# Patient Record
Sex: FEMALE | Race: WHITE | URBAN - METROPOLITAN AREA
[De-identification: names, ages, dates, MRNs, and addresses within clinical notes are randomized per-mention and may not be internally consistent; named-entity substitution may affect disease eponyms.]

---

## 2017-04-21 ENCOUNTER — FOLLOW UP ESTABLISHED (OUTPATIENT)
Dept: URBAN - METROPOLITAN AREA CLINIC 24 | Facility: CLINIC | Age: 77
End: 2017-04-21
Payer: COMMERCIAL

## 2017-04-21 PROCEDURE — 92012 INTRM OPH EXAM EST PATIENT: CPT | Performed by: OPHTHALMOLOGY

## 2017-04-21 RX ORDER — TIMOLOL MALEATE 5 MG/ML
0.5 % SOLUTION/ DROPS OPHTHALMIC
Qty: 3 | Refills: 3 | Status: INACTIVE
Start: 2017-04-21 | End: 2019-02-26

## 2017-04-21 ASSESSMENT — INTRAOCULAR PRESSURE
OS: 21
OD: 22

## 2017-09-12 ENCOUNTER — FOLLOW UP ESTABLISHED (OUTPATIENT)
Dept: URBAN - METROPOLITAN AREA CLINIC 24 | Facility: CLINIC | Age: 77
End: 2017-09-12
Payer: COMMERCIAL

## 2017-09-12 DIAGNOSIS — H40.053 OCULAR HYPERTENSION, BILATERAL: Primary | ICD-10-CM

## 2017-09-12 PROCEDURE — 92250 FUNDUS PHOTOGRAPHY W/I&R: CPT | Performed by: OPHTHALMOLOGY

## 2017-09-12 PROCEDURE — 92014 COMPRE OPH EXAM EST PT 1/>: CPT | Performed by: OPHTHALMOLOGY

## 2017-09-12 PROCEDURE — 92083 EXTENDED VISUAL FIELD XM: CPT | Performed by: OPHTHALMOLOGY

## 2017-09-12 ASSESSMENT — INTRAOCULAR PRESSURE
OD: 19
OD: 20
OS: 19
OS: 15

## 2018-02-21 ENCOUNTER — FOLLOW UP ESTABLISHED (OUTPATIENT)
Dept: URBAN - METROPOLITAN AREA CLINIC 26 | Facility: CLINIC | Age: 78
End: 2018-02-21
Payer: MEDICARE

## 2018-02-21 DIAGNOSIS — H43.813 VITREOUS DEGENERATION, BILATERAL: ICD-10-CM

## 2018-02-21 PROCEDURE — 92014 COMPRE OPH EXAM EST PT 1/>: CPT | Performed by: OPTOMETRIST

## 2018-02-21 ASSESSMENT — INTRAOCULAR PRESSURE
OS: 21
OD: 22

## 2018-02-21 ASSESSMENT — VISUAL ACUITY
OD: 20/25
OS: 20/20

## 2018-07-23 ENCOUNTER — FOLLOW UP ESTABLISHED (OUTPATIENT)
Dept: URBAN - METROPOLITAN AREA CLINIC 26 | Facility: CLINIC | Age: 78
End: 2018-07-23
Payer: MEDICARE

## 2018-07-23 DIAGNOSIS — H04.123 TEAR FILM INSUFFICIENCY OF BILATERAL LACRIMAL GLANDS: Primary | ICD-10-CM

## 2018-07-23 PROCEDURE — 92014 COMPRE OPH EXAM EST PT 1/>: CPT | Performed by: OPTOMETRIST

## 2018-07-23 ASSESSMENT — INTRAOCULAR PRESSURE
OS: 16
OD: 24

## 2019-02-26 ENCOUNTER — FOLLOW UP ESTABLISHED (OUTPATIENT)
Dept: URBAN - METROPOLITAN AREA CLINIC 26 | Facility: CLINIC | Age: 79
End: 2019-02-26
Payer: MEDICARE

## 2019-02-26 DIAGNOSIS — H35.371 PUCKERING OF MACULA, RIGHT EYE: ICD-10-CM

## 2019-02-26 PROCEDURE — 92083 EXTENDED VISUAL FIELD XM: CPT | Performed by: OPTOMETRIST

## 2019-02-26 PROCEDURE — 92134 CPTRZ OPH DX IMG PST SGM RTA: CPT | Performed by: OPTOMETRIST

## 2019-02-26 PROCEDURE — 92014 COMPRE OPH EXAM EST PT 1/>: CPT | Performed by: OPTOMETRIST

## 2019-02-26 PROCEDURE — 92133 CPTRZD OPH DX IMG PST SGM ON: CPT | Performed by: OPTOMETRIST

## 2019-02-26 RX ORDER — TIMOLOL MALEATE 5 MG/ML
0.5 % SOLUTION/ DROPS OPHTHALMIC
Qty: 3 | Refills: 3 | Status: INACTIVE
Start: 2019-02-26 | End: 2020-09-14

## 2019-02-26 RX ORDER — BIMATOPROST 0.1 MG/ML
0.01 % SOLUTION/ DROPS OPHTHALMIC
Qty: 1 | Refills: 4 | Status: INACTIVE
Start: 2019-02-26 | End: 2019-02-27

## 2019-02-26 ASSESSMENT — KERATOMETRY
OD: 46.13
OS: 46.88

## 2019-02-26 ASSESSMENT — INTRAOCULAR PRESSURE
OS: 20
OD: 35

## 2019-02-26 ASSESSMENT — VISUAL ACUITY
OS: 20/20
OD: 20/30

## 2019-03-12 ENCOUNTER — FOLLOW UP ESTABLISHED (OUTPATIENT)
Dept: URBAN - METROPOLITAN AREA CLINIC 10 | Facility: CLINIC | Age: 79
End: 2019-03-12
Payer: MEDICARE

## 2019-03-12 DIAGNOSIS — H40.1434 CAPSULAR GLAUCOMA W/ PSEUDOEXFOLIATION OF LENS, BILATERAL, INDETERMINATE STAGE: ICD-10-CM

## 2019-03-12 PROCEDURE — 92014 COMPRE OPH EXAM EST PT 1/>: CPT | Performed by: OPHTHALMOLOGY

## 2019-03-12 PROCEDURE — 76514 ECHO EXAM OF EYE THICKNESS: CPT | Performed by: OPHTHALMOLOGY

## 2019-03-12 RX ORDER — NETARSUDIL 0.2 MG/ML
0.02 % SOLUTION/ DROPS OPHTHALMIC; TOPICAL
Qty: 3 | Refills: 1 | Status: INACTIVE
Start: 2019-03-12 | End: 2019-03-13

## 2019-03-12 ASSESSMENT — INTRAOCULAR PRESSURE
OD: 34
OS: 23

## 2019-03-13 ENCOUNTER — CONSULT (OUTPATIENT)
Dept: URBAN - METROPOLITAN AREA CLINIC 24 | Facility: CLINIC | Age: 79
End: 2019-03-13
Payer: MEDICARE

## 2019-03-13 PROCEDURE — 92134 CPTRZ OPH DX IMG PST SGM RTA: CPT | Performed by: OPHTHALMOLOGY

## 2019-03-13 PROCEDURE — 92014 COMPRE OPH EXAM EST PT 1/>: CPT | Performed by: OPHTHALMOLOGY

## 2019-03-13 ASSESSMENT — INTRAOCULAR PRESSURE
OS: 17
OD: 33

## 2019-03-20 ENCOUNTER — FOLLOW UP ESTABLISHED (OUTPATIENT)
Dept: URBAN - METROPOLITAN AREA CLINIC 26 | Facility: CLINIC | Age: 79
End: 2019-03-20
Payer: MEDICARE

## 2019-03-20 PROCEDURE — 92083 EXTENDED VISUAL FIELD XM: CPT | Performed by: OPTOMETRIST

## 2019-03-20 PROCEDURE — 92012 INTRM OPH EXAM EST PATIENT: CPT | Performed by: OPTOMETRIST

## 2019-03-20 ASSESSMENT — INTRAOCULAR PRESSURE
OD: 21
OS: 15

## 2019-06-19 ENCOUNTER — FOLLOW UP ESTABLISHED (OUTPATIENT)
Dept: URBAN - METROPOLITAN AREA CLINIC 24 | Facility: CLINIC | Age: 79
End: 2019-06-19
Payer: MEDICARE

## 2019-06-19 PROCEDURE — 92134 CPTRZ OPH DX IMG PST SGM RTA: CPT | Performed by: OPHTHALMOLOGY

## 2019-06-19 PROCEDURE — 92014 COMPRE OPH EXAM EST PT 1/>: CPT | Performed by: OPHTHALMOLOGY

## 2019-06-19 ASSESSMENT — INTRAOCULAR PRESSURE
OD: 20
OS: 17

## 2019-06-21 ENCOUNTER — FOLLOW UP ESTABLISHED (OUTPATIENT)
Dept: URBAN - METROPOLITAN AREA CLINIC 24 | Facility: CLINIC | Age: 79
End: 2019-06-21
Payer: MEDICARE

## 2019-06-21 PROCEDURE — 92012 INTRM OPH EXAM EST PATIENT: CPT | Performed by: OPHTHALMOLOGY

## 2019-06-21 ASSESSMENT — INTRAOCULAR PRESSURE
OD: 19
OS: 16

## 2019-09-04 ENCOUNTER — FOLLOW UP ESTABLISHED (OUTPATIENT)
Dept: URBAN - METROPOLITAN AREA CLINIC 24 | Facility: CLINIC | Age: 79
End: 2019-09-04
Payer: MEDICARE

## 2019-09-04 PROCEDURE — 92012 INTRM OPH EXAM EST PATIENT: CPT | Performed by: OPHTHALMOLOGY

## 2019-09-04 ASSESSMENT — INTRAOCULAR PRESSURE
OD: 26
OS: 18

## 2019-09-25 ENCOUNTER — FOLLOW UP ESTABLISHED (OUTPATIENT)
Dept: URBAN - METROPOLITAN AREA CLINIC 24 | Facility: CLINIC | Age: 79
End: 2019-09-25
Payer: MEDICARE

## 2019-09-25 PROCEDURE — 92020 GONIOSCOPY: CPT | Performed by: OPHTHALMOLOGY

## 2019-09-25 PROCEDURE — 92014 COMPRE OPH EXAM EST PT 1/>: CPT | Performed by: OPHTHALMOLOGY

## 2019-09-25 RX ORDER — NETARSUDIL 0.2 MG/ML
0.02 % SOLUTION/ DROPS OPHTHALMIC; TOPICAL
Qty: 3 | Refills: 1 | Status: INACTIVE
Start: 2019-09-25 | End: 2020-08-03

## 2019-09-25 ASSESSMENT — INTRAOCULAR PRESSURE
OD: 36
OS: 22

## 2019-10-22 ENCOUNTER — Encounter (OUTPATIENT)
Dept: URBAN - METROPOLITAN AREA CLINIC 24 | Facility: CLINIC | Age: 79
End: 2019-10-22
Payer: MEDICARE

## 2019-10-22 PROCEDURE — 65855 TRABECULOPLASTY LASER SURG: CPT | Performed by: OPHTHALMOLOGY

## 2019-10-23 ENCOUNTER — Encounter (OUTPATIENT)
Dept: URBAN - METROPOLITAN AREA CLINIC 24 | Facility: CLINIC | Age: 79
End: 2019-10-23
Payer: MEDICARE

## 2019-10-23 PROCEDURE — 65855 TRABECULOPLASTY LASER SURG: CPT | Performed by: OPHTHALMOLOGY

## 2019-11-05 ENCOUNTER — FOLLOW UP ESTABLISHED (OUTPATIENT)
Dept: URBAN - METROPOLITAN AREA CLINIC 24 | Facility: CLINIC | Age: 79
End: 2019-11-05
Payer: MEDICARE

## 2019-11-05 PROCEDURE — 92134 CPTRZ OPH DX IMG PST SGM RTA: CPT | Performed by: OPHTHALMOLOGY

## 2019-11-05 PROCEDURE — 92014 COMPRE OPH EXAM EST PT 1/>: CPT | Performed by: OPHTHALMOLOGY

## 2019-11-05 RX ORDER — PREDNISOLONE ACETATE 10 MG/ML
1 % SUSPENSION/ DROPS OPHTHALMIC
Qty: 1 | Refills: 1 | Status: INACTIVE
Start: 2019-11-05 | End: 2020-01-31

## 2019-11-05 ASSESSMENT — INTRAOCULAR PRESSURE
OD: 22
OS: 15

## 2019-12-03 ENCOUNTER — FOLLOW UP ESTABLISHED (OUTPATIENT)
Dept: URBAN - METROPOLITAN AREA CLINIC 24 | Facility: CLINIC | Age: 79
End: 2019-12-03
Payer: MEDICARE

## 2019-12-03 PROCEDURE — 92250 FUNDUS PHOTOGRAPHY W/I&R: CPT | Performed by: OPHTHALMOLOGY

## 2019-12-03 PROCEDURE — 92014 COMPRE OPH EXAM EST PT 1/>: CPT | Performed by: OPHTHALMOLOGY

## 2019-12-03 ASSESSMENT — INTRAOCULAR PRESSURE
OS: 19
OD: 39

## 2019-12-05 ENCOUNTER — FOLLOW UP ESTABLISHED (OUTPATIENT)
Dept: URBAN - METROPOLITAN AREA CLINIC 24 | Facility: CLINIC | Age: 79
End: 2019-12-05
Payer: MEDICARE

## 2019-12-05 PROCEDURE — 92012 INTRM OPH EXAM EST PATIENT: CPT | Performed by: OPHTHALMOLOGY

## 2019-12-05 ASSESSMENT — INTRAOCULAR PRESSURE
OS: 20
OD: 21

## 2019-12-12 ENCOUNTER — FOLLOW UP ESTABLISHED (OUTPATIENT)
Dept: URBAN - METROPOLITAN AREA CLINIC 24 | Facility: CLINIC | Age: 79
End: 2019-12-12
Payer: MEDICARE

## 2019-12-12 PROCEDURE — 92014 COMPRE OPH EXAM EST PT 1/>: CPT | Performed by: OPHTHALMOLOGY

## 2019-12-12 PROCEDURE — 92134 CPTRZ OPH DX IMG PST SGM RTA: CPT | Performed by: OPHTHALMOLOGY

## 2019-12-12 ASSESSMENT — INTRAOCULAR PRESSURE
OS: 18
OD: 28

## 2020-01-09 ENCOUNTER — FOLLOW UP ESTABLISHED (OUTPATIENT)
Dept: URBAN - METROPOLITAN AREA CLINIC 24 | Facility: CLINIC | Age: 80
End: 2020-01-09
Payer: MEDICARE

## 2020-01-09 PROCEDURE — 92012 INTRM OPH EXAM EST PATIENT: CPT | Performed by: OPHTHALMOLOGY

## 2020-01-09 ASSESSMENT — INTRAOCULAR PRESSURE
OS: 17
OD: 28

## 2020-01-31 ENCOUNTER — FOLLOW UP ESTABLISHED (OUTPATIENT)
Dept: URBAN - METROPOLITAN AREA CLINIC 24 | Facility: CLINIC | Age: 80
End: 2020-01-31
Payer: MEDICARE

## 2020-01-31 PROCEDURE — 92014 COMPRE OPH EXAM EST PT 1/>: CPT | Performed by: OPHTHALMOLOGY

## 2020-01-31 PROCEDURE — 92020 GONIOSCOPY: CPT | Performed by: OPHTHALMOLOGY

## 2020-01-31 ASSESSMENT — INTRAOCULAR PRESSURE
OD: 29
OS: 19

## 2020-03-13 ENCOUNTER — SURGERY (OUTPATIENT)
Dept: URBAN - METROPOLITAN AREA SURGERY 12 | Facility: SURGERY | Age: 80
End: 2020-03-13
Payer: MEDICARE

## 2020-03-13 PROCEDURE — 65855 TRABECULOPLASTY LASER SURG: CPT | Performed by: OPHTHALMOLOGY

## 2020-04-24 ENCOUNTER — FOLLOW UP ESTABLISHED (OUTPATIENT)
Dept: URBAN - METROPOLITAN AREA CLINIC 24 | Facility: CLINIC | Age: 80
End: 2020-04-24
Payer: MEDICARE

## 2020-04-24 PROCEDURE — 92133 CPTRZD OPH DX IMG PST SGM ON: CPT | Performed by: OPHTHALMOLOGY

## 2020-04-24 PROCEDURE — 92012 INTRM OPH EXAM EST PATIENT: CPT | Performed by: OPHTHALMOLOGY

## 2020-04-24 RX ORDER — BRIMONIDINE TARTRATE 2 MG/ML
0.2 % SOLUTION/ DROPS OPHTHALMIC
Qty: 3 | Refills: 3 | Status: INACTIVE
Start: 2020-04-24 | End: 2020-09-14

## 2020-04-24 ASSESSMENT — INTRAOCULAR PRESSURE
OS: 19
OD: 25

## 2020-07-23 ENCOUNTER — FOLLOW UP ESTABLISHED (OUTPATIENT)
Dept: URBAN - METROPOLITAN AREA CLINIC 24 | Facility: CLINIC | Age: 80
End: 2020-07-23
Payer: MEDICARE

## 2020-07-23 PROCEDURE — 92012 INTRM OPH EXAM EST PATIENT: CPT | Performed by: OPHTHALMOLOGY

## 2020-07-23 PROCEDURE — 92083 EXTENDED VISUAL FIELD XM: CPT | Performed by: OPHTHALMOLOGY

## 2020-07-23 ASSESSMENT — INTRAOCULAR PRESSURE
OD: 36
OS: 21

## 2020-08-03 ENCOUNTER — Encounter (OUTPATIENT)
Dept: URBAN - METROPOLITAN AREA CLINIC 24 | Facility: CLINIC | Age: 80
End: 2020-08-03
Payer: MEDICARE

## 2020-08-03 DIAGNOSIS — Z01.818 ENCOUNTER FOR OTHER PREPROCEDURAL EXAMINATION: Primary | ICD-10-CM

## 2020-08-03 PROCEDURE — 99213 OFFICE O/P EST LOW 20 MIN: CPT | Performed by: PHYSICIAN ASSISTANT

## 2020-08-03 RX ORDER — ATORVASTATIN CALCIUM 20 MG/1
20 MG TABLET, FILM COATED ORAL
Qty: 0 | Refills: 0 | Status: ACTIVE
Start: 2020-08-03

## 2020-08-03 RX ORDER — OMEPRAZOLE 20 MG/1
20 MG CAPSULE, DELAYED RELEASE ORAL
Qty: 0 | Refills: 0 | Status: ACTIVE
Start: 2020-08-03

## 2020-08-07 ENCOUNTER — SURGERY (OUTPATIENT)
Dept: URBAN - METROPOLITAN AREA SURGERY 12 | Facility: SURGERY | Age: 80
End: 2020-08-07
Payer: MEDICARE

## 2020-08-07 PROCEDURE — 66180 AQUEOUS SHUNT EYE W/GRAFT: CPT | Performed by: OPHTHALMOLOGY

## 2020-08-08 ENCOUNTER — POST OP (OUTPATIENT)
Dept: URBAN - METROPOLITAN AREA CLINIC 10 | Facility: CLINIC | Age: 80
End: 2020-08-08

## 2020-08-08 PROCEDURE — 99024 POSTOP FOLLOW-UP VISIT: CPT | Performed by: OPTOMETRIST

## 2020-08-08 ASSESSMENT — INTRAOCULAR PRESSURE: OD: 20

## 2020-08-14 ENCOUNTER — FOLLOW UP ESTABLISHED (OUTPATIENT)
Dept: URBAN - METROPOLITAN AREA CLINIC 24 | Facility: CLINIC | Age: 80
End: 2020-08-14
Payer: MEDICARE

## 2020-08-14 PROCEDURE — 99024 POSTOP FOLLOW-UP VISIT: CPT | Performed by: OPHTHALMOLOGY

## 2020-08-14 RX ORDER — OFLOXACIN 3 MG/ML
0.3 % SOLUTION/ DROPS OPHTHALMIC
Qty: 1 | Refills: 1 | Status: INACTIVE
Start: 2020-08-14 | End: 2020-09-11

## 2020-08-14 RX ORDER — PREDNISOLONE ACETATE 10 MG/ML
1 % SUSPENSION/ DROPS OPHTHALMIC
Qty: 1 | Refills: 1 | Status: INACTIVE
Start: 2020-08-14 | End: 2020-09-11

## 2020-08-14 ASSESSMENT — INTRAOCULAR PRESSURE
OS: 22
OD: 22

## 2020-09-11 ENCOUNTER — FOLLOW UP ESTABLISHED (OUTPATIENT)
Dept: URBAN - METROPOLITAN AREA CLINIC 24 | Facility: CLINIC | Age: 80
End: 2020-09-11
Payer: MEDICARE

## 2020-09-11 PROCEDURE — 99024 POSTOP FOLLOW-UP VISIT: CPT | Performed by: OPHTHALMOLOGY

## 2020-09-11 RX ORDER — PREDNISOLONE ACETATE 10 MG/ML
1 % SUSPENSION/ DROPS OPHTHALMIC
Qty: 1 | Refills: 1 | Status: INACTIVE
Start: 2020-09-11 | End: 2020-09-23

## 2020-09-11 ASSESSMENT — INTRAOCULAR PRESSURE
OS: 18
OD: 3

## 2020-09-14 ENCOUNTER — CONSULT (OUTPATIENT)
Dept: URBAN - METROPOLITAN AREA CLINIC 24 | Facility: CLINIC | Age: 80
End: 2020-09-14
Payer: MEDICARE

## 2020-09-14 DIAGNOSIS — H21.01 HYPHEMA, RIGHT EYE: ICD-10-CM

## 2020-09-14 DIAGNOSIS — H33.021 RETINAL DETACHMENT WITH MULTIPLE BREAKS, RIGHT EYE: Primary | ICD-10-CM

## 2020-09-14 DIAGNOSIS — H43.11 VITREOUS HEMORRHAGE, RIGHT EYE: Primary | ICD-10-CM

## 2020-09-14 DIAGNOSIS — H31.9 UNSPECIFIED DISORDER OF CHOROID: ICD-10-CM

## 2020-09-14 PROCEDURE — 92014 COMPRE OPH EXAM EST PT 1/>: CPT | Performed by: OPTOMETRIST

## 2020-09-14 PROCEDURE — 92134 CPTRZ OPH DX IMG PST SGM RTA: CPT | Performed by: OPTOMETRIST

## 2020-09-14 PROCEDURE — 92134 CPTRZ OPH DX IMG PST SGM RTA: CPT | Performed by: OPHTHALMOLOGY

## 2020-09-14 PROCEDURE — 92014 COMPRE OPH EXAM EST PT 1/>: CPT | Performed by: OPHTHALMOLOGY

## 2020-09-14 RX ORDER — BRIMONIDINE TARTRATE 2 MG/ML
0.2 % SOLUTION/ DROPS OPHTHALMIC
Qty: 3 | Refills: 3 | Status: INACTIVE
Start: 2020-09-14 | End: 2020-09-23

## 2020-09-14 RX ORDER — TIMOLOL MALEATE 5 MG/ML
0.5 % SOLUTION/ DROPS OPHTHALMIC
Qty: 3 | Refills: 3 | Status: INACTIVE
Start: 2020-09-14 | End: 2021-02-11

## 2020-09-14 ASSESSMENT — INTRAOCULAR PRESSURE
OS: 18
OS: 18
OD: 8
OD: 8

## 2020-09-23 ENCOUNTER — FOLLOW UP ESTABLISHED (OUTPATIENT)
Dept: URBAN - METROPOLITAN AREA CLINIC 24 | Facility: CLINIC | Age: 80
End: 2020-09-23
Payer: MEDICARE

## 2020-09-23 PROCEDURE — 99024 POSTOP FOLLOW-UP VISIT: CPT | Performed by: OPHTHALMOLOGY

## 2020-09-23 RX ORDER — BRIMONIDINE TARTRATE 2 MG/ML
0.2 % SOLUTION/ DROPS OPHTHALMIC
Qty: 3 | Refills: 3 | Status: INACTIVE
Start: 2020-09-23 | End: 2021-02-11

## 2020-09-23 RX ORDER — PREDNISOLONE ACETATE 10 MG/ML
1 % SUSPENSION/ DROPS OPHTHALMIC
Qty: 1 | Refills: 1 | Status: INACTIVE
Start: 2020-09-23 | End: 2021-02-11

## 2020-09-23 ASSESSMENT — INTRAOCULAR PRESSURE
OS: 21
OD: 12

## 2020-10-09 ENCOUNTER — FOLLOW UP ESTABLISHED (OUTPATIENT)
Dept: URBAN - METROPOLITAN AREA CLINIC 24 | Facility: CLINIC | Age: 80
End: 2020-10-09
Payer: MEDICARE

## 2020-10-09 PROCEDURE — 99024 POSTOP FOLLOW-UP VISIT: CPT | Performed by: OPHTHALMOLOGY

## 2020-10-09 ASSESSMENT — INTRAOCULAR PRESSURE
OD: 13
OS: 15

## 2020-11-06 ENCOUNTER — FOLLOW UP ESTABLISHED (OUTPATIENT)
Dept: URBAN - METROPOLITAN AREA CLINIC 24 | Facility: CLINIC | Age: 80
End: 2020-11-06
Payer: MEDICARE

## 2020-11-06 PROCEDURE — 99024 POSTOP FOLLOW-UP VISIT: CPT | Performed by: OPHTHALMOLOGY

## 2020-11-06 ASSESSMENT — INTRAOCULAR PRESSURE
OS: 17
OD: 13

## 2020-12-03 ENCOUNTER — FOLLOW UP ESTABLISHED (OUTPATIENT)
Dept: URBAN - METROPOLITAN AREA CLINIC 24 | Facility: CLINIC | Age: 80
End: 2020-12-03
Payer: MEDICARE

## 2020-12-03 PROCEDURE — 92012 INTRM OPH EXAM EST PATIENT: CPT | Performed by: OPHTHALMOLOGY

## 2020-12-03 ASSESSMENT — INTRAOCULAR PRESSURE
OS: 19
OD: 13

## 2021-02-11 ENCOUNTER — FOLLOW UP ESTABLISHED (OUTPATIENT)
Dept: URBAN - METROPOLITAN AREA CLINIC 24 | Facility: CLINIC | Age: 81
End: 2021-02-11
Payer: MEDICARE

## 2021-02-11 PROCEDURE — 92083 EXTENDED VISUAL FIELD XM: CPT | Performed by: OPHTHALMOLOGY

## 2021-02-11 PROCEDURE — 99214 OFFICE O/P EST MOD 30 MIN: CPT | Performed by: OPHTHALMOLOGY

## 2021-02-11 PROCEDURE — 92133 CPTRZD OPH DX IMG PST SGM ON: CPT | Performed by: OPHTHALMOLOGY

## 2021-02-11 RX ORDER — BRIMONIDINE TARTRATE 2 MG/ML
0.2 % SOLUTION/ DROPS OPHTHALMIC
Qty: 15 | Refills: 3 | Status: INACTIVE
Start: 2021-02-11 | End: 2021-04-07

## 2021-02-11 RX ORDER — TIMOLOL MALEATE 5 MG/ML
0.5 % SOLUTION/ DROPS OPHTHALMIC
Qty: 15 | Refills: 3 | Status: ACTIVE
Start: 2021-02-11

## 2021-02-11 ASSESSMENT — INTRAOCULAR PRESSURE
OS: 21
OD: 20

## 2021-05-19 ENCOUNTER — OFFICE VISIT (OUTPATIENT)
Dept: URBAN - METROPOLITAN AREA CLINIC 24 | Facility: CLINIC | Age: 81
End: 2021-05-19
Payer: MEDICARE

## 2021-05-19 PROCEDURE — 99214 OFFICE O/P EST MOD 30 MIN: CPT | Performed by: OPHTHALMOLOGY

## 2021-05-19 PROCEDURE — 92083 EXTENDED VISUAL FIELD XM: CPT | Performed by: OPHTHALMOLOGY

## 2021-05-19 ASSESSMENT — INTRAOCULAR PRESSURE
OS: 21
OS: 20
OD: 22

## 2021-11-17 ENCOUNTER — OFFICE VISIT (OUTPATIENT)
Dept: URBAN - METROPOLITAN AREA CLINIC 24 | Facility: CLINIC | Age: 81
End: 2021-11-17
Payer: MEDICARE

## 2021-11-17 DIAGNOSIS — H40.1431 CAPSULAR GLAUCOMA WITH PSEUDOEXFOLIATION OF LENS, BILATERAL, MILD STAGE: Primary | ICD-10-CM

## 2021-11-17 PROCEDURE — 99214 OFFICE O/P EST MOD 30 MIN: CPT | Performed by: OPHTHALMOLOGY

## 2021-11-17 PROCEDURE — 92083 EXTENDED VISUAL FIELD XM: CPT | Performed by: OPHTHALMOLOGY

## 2021-11-17 ASSESSMENT — INTRAOCULAR PRESSURE
OD: 23
OS: 19

## 2021-11-17 NOTE — IMPRESSION/PLAN
Impression: Capslr glaucoma w/pseudoef lens, bilateral, mild stage
h/o hyphema OD 2/2 UGH syndrome - resolved 12/3/19 - Joey Duncan and Shy
-- s/p Baerveldt Tube + K Patch OD - Dr Meche Montanez - choroidals after opening -- 8/7/2020 OD tube Opened as of 9/11/2020 Plan: Pt has Glaucoma    Gonio :  CBB with 3+ pig    Pachs:   582/575   Today's IOP : 23/19  Tmax  :  48/37 Target IOP 20 OU B-Scan done (11/5/19) No RD, no blood OD (Bscan performed 2/2 hyphema of unknown origin OD) Pt denies Family hx of Glaucoma Left eye is the better seeing eye HVF 24-2 (11/17/21) OD: Full OS nonpattern loss C/D:0.2x0.2 OU healthy nerves 5/19/21 OCT : Stable OU (02/11/2021) 84, 85 Pt denies Sulfa Allergy   // Pt denies Lung /Heart dx No previous medications used Plan :
1. Cont: 
Brimonidine TID OS Timolol QAM OS 2. Patient's IOP is Stable. Visual field with nonpattern change OS (poor reliability). Patient to continue all medication as instructed. ONH appearance stable OU 3.  Return in 6 months for IOP check/RNFL and see Dr. Joey Duncan for macular edema OD 1-2 months

## 2021-11-17 NOTE — IMPRESSION/PLAN
Impression: Vitreous hemorrhage, right eye - Mar '19   -  UGH syndrome - RESOLVED Plan: Pt with fluctuating VA OD - improved today however continued macular edema OD in remote past.  Encouraged she return to see Dr. Juanito Perez for continued care PER DR WELLS'S LAST NOTE 9/14/20:  see hx: [[Onset vit heme OD Mar '19 w PVD and THEN NOV '19 --May indeed be UGH, one piece IOL tilted w supr  shift w TIDs]. HOWEVER TODAY rare RBC only, and repeated  confirms RETINA no tear / RD. NO NVI. REASSURED pt - No ER surgery -- > LONG talk w pt  - IOP GOOD - CHOROIDALS will RESOLVE. UNLIKELY to need VIT / AC washout / Laser OD future w IOL R&R for sutured IOL unless HMG RECUR
    FOR now pt wants to OBSERVE. F/u IOP care. Expect Choroidals to resolve RETINA PRN if any recur HMG (hyphm / vit) to RE-CONSIDER UGH Syndr tx w R&R IOL.

## 2022-01-17 ENCOUNTER — OFFICE VISIT (OUTPATIENT)
Dept: URBAN - METROPOLITAN AREA CLINIC 24 | Facility: CLINIC | Age: 82
End: 2022-01-17
Payer: MEDICARE

## 2022-01-17 DIAGNOSIS — H35.351 CYSTOID MACULAR DEGENERATION, RIGHT EYE: ICD-10-CM

## 2022-01-17 PROCEDURE — 92134 CPTRZ OPH DX IMG PST SGM RTA: CPT | Performed by: OPHTHALMOLOGY

## 2022-01-17 PROCEDURE — 99214 OFFICE O/P EST MOD 30 MIN: CPT | Performed by: OPHTHALMOLOGY

## 2022-01-17 ASSESSMENT — INTRAOCULAR PRESSURE
OS: 18
OD: 21

## 2022-01-17 NOTE — IMPRESSION/PLAN
Impression: Cystoid macular CME Right eye Plan: Prior. RESOLVED by retina in past STTA - May use PG's gtts if absolutely needed. Relative (not absolute) contraindication. RECUR CME.  D/w'd pt -- she notes this is my bad eye . . . I don't notice much chng when you say there is or isn't swelling so pt opted no injx.

## 2022-01-17 NOTE — IMPRESSION/PLAN
Impression: H/o HMG OD '19 - UGH syndr. - CME ebb/flow Plan: see hx: [[Onset vit heme OD Mar '19 w PVD and THEN NOV '19 --May indeed be UGH, one piece IOL tilted w supr  shift w TIDs]. HOWEVER TODAY rare RBC only, and repeated  confirms RETINA no tear / RD. NO NVI. TODAY add back --No recur Hmg. CHOROIDALS did RESOLVE. LONG D/w pt --  CME ebb / flow. R/ba and options for injx Pt opted NO injx / Maggy Roughen / Surgery for CME ebb/flow OD. FOR now pt wants to OBSERVE.    
    RTC RETINA 6mo or PRN if any recur HMG (hyphm / vit)  -- Tolerate ebb/flow CME

## 2022-05-18 ENCOUNTER — OFFICE VISIT (OUTPATIENT)
Dept: URBAN - METROPOLITAN AREA CLINIC 24 | Facility: CLINIC | Age: 82
End: 2022-05-18
Payer: MEDICARE

## 2022-05-18 DIAGNOSIS — H40.1431 CAPSULAR GLAUCOMA WITH PSEUDOEXFOLIATION OF LENS, BILATERAL, MILD STAGE: Primary | ICD-10-CM

## 2022-05-18 DIAGNOSIS — H53.10 SUBJECTIVE VISUAL DISTURBANCE: ICD-10-CM

## 2022-05-18 PROCEDURE — 99213 OFFICE O/P EST LOW 20 MIN: CPT | Performed by: OPHTHALMOLOGY

## 2022-05-18 ASSESSMENT — INTRAOCULAR PRESSURE
OD: 23
OS: 21

## 2022-05-18 NOTE — IMPRESSION/PLAN
Impression: Capslr glaucoma w/pseudoef lens, bilateral, mild stage
h/o hyphema OD 2/2 UGH syndrome - resolved 12/3/19 - Tammie Davenport
-- s/p Baerveldt Tube + K Patch OD - Dr Abdoulaye Mott - choroidals after opening -- 8/7/2020 OD tube Opened as of 9/11/2020 Plan: Pt has Glaucoma    Gonio :  CBB with 3+ pig    Pachs:   582/575   Today's IOP : 23/21  Tmax  :  48/37 Target IOP 20 OU B-Scan done (11/5/19) No RD, no blood OD (Bscan performed 2/2 hyphema of unknown origin OD) Pt denies Family hx of Glaucoma Left eye is the better seeing eye HVF 24-2 (11/17/21) OD: Full OS nonpattern loss C/D:0.2x0.2 OU healthy nerves 5/19/21 OCT : Stable OU (02/11/2021) 84, 85 Pt denies Sulfa Allergy   // Pt denies Lung /Heart dx No previous medications used Plan :
1. Cont: 
Brimonidine TID OS Timolol QAM OS 2. Patient's IOP is Stable. Visual field with non pattern change OS (poor reliability). Patient to continue all medication as instructed. ONH appearance stable OU 3.  Return in 2 months  with Dr. Tammie Alex 6 months for IOP check with [facility]  and 8 mo to 1 Year Dr. Abdoulaye Mott

## 2022-05-18 NOTE — IMPRESSION/PLAN
Impression: Subjective visual disturbance: H53.10. Plan: Patient notices an occasional white floater. Exam unremarkable today. Per last exam with Dr. Tere Solis patient to return in 6 months. Will have patient return to Dr. Tere Solis as requested.

## 2022-07-18 ENCOUNTER — OFFICE VISIT (OUTPATIENT)
Dept: URBAN - METROPOLITAN AREA CLINIC 24 | Facility: CLINIC | Age: 82
End: 2022-07-18
Payer: MEDICARE

## 2022-07-18 DIAGNOSIS — H43.11 VITREOUS HEMORRHAGE, RIGHT EYE: ICD-10-CM

## 2022-07-18 DIAGNOSIS — H35.351 CYSTOID MACULAR DEGENERATION, RIGHT EYE: Primary | ICD-10-CM

## 2022-07-18 PROCEDURE — 92134 CPTRZ OPH DX IMG PST SGM RTA: CPT | Performed by: OPHTHALMOLOGY

## 2022-07-18 PROCEDURE — 99214 OFFICE O/P EST MOD 30 MIN: CPT | Performed by: OPHTHALMOLOGY

## 2022-07-18 ASSESSMENT — INTRAOCULAR PRESSURE
OS: 16
OD: 19

## 2022-07-18 NOTE — IMPRESSION/PLAN
Impression: H/o HMG OD '19 - UGH syndr. - CME ebb/flow Plan: see hx: [[Onset vit heme OD Mar '19 w PVD and THEN NOV '19 --May indeed be UGH, one piece IOL tilted w supr  shift w TIDs]. HOWEVER TODAY rare RBC only, and repeated  confirms RETINA no tear / RD. NO NVI. TODAY add back --No recur Hmg. CHOROIDALS did RESOLVE. LONG D/w pt --  CME ebb / flow. RECUR '21-22  R/ba and options for injx Pt AGAIN opted NO injx / Kayce Bernard / Surgery for CME ebb/flow OD. FOR now pt wants to OBSERVE and NOT TREAT the CME edema     RTC RETINA PRN if any recur HMG (hyphm / vit)  -- Tolerate ebb/flow CME - observe

## 2022-11-15 ENCOUNTER — OFFICE VISIT (OUTPATIENT)
Dept: URBAN - METROPOLITAN AREA CLINIC 24 | Facility: CLINIC | Age: 82
End: 2022-11-15
Payer: MEDICARE

## 2022-11-15 DIAGNOSIS — H35.351 CYSTOID MACULAR DEGENERATION, RIGHT EYE: ICD-10-CM

## 2022-11-15 DIAGNOSIS — H43.11 VITREOUS HEMORRHAGE, RIGHT EYE: ICD-10-CM

## 2022-11-15 DIAGNOSIS — H40.1431 CAPSULAR GLAUCOMA WITH PSEUDOEXFOLIATION OF LENS, BILATERAL, MILD STAGE: Primary | ICD-10-CM

## 2022-11-15 PROCEDURE — 92134 CPTRZ OPH DX IMG PST SGM RTA: CPT | Performed by: STUDENT IN AN ORGANIZED HEALTH CARE EDUCATION/TRAINING PROGRAM

## 2022-11-15 PROCEDURE — 99213 OFFICE O/P EST LOW 20 MIN: CPT | Performed by: STUDENT IN AN ORGANIZED HEALTH CARE EDUCATION/TRAINING PROGRAM

## 2022-11-15 ASSESSMENT — INTRAOCULAR PRESSURE
OS: 21
OD: 25
OS: 16
OD: 23

## 2022-11-15 ASSESSMENT — VISUAL ACUITY
OS: 20/20
OD: 20/150

## 2022-11-15 NOTE — IMPRESSION/PLAN
Impression: H/o HMG OD '19 
- UGH syndr. 
- CME ebb/flow
- [[Onset vit heme OD Mar '19 w PVD and THEN NOV '19 --May indeed be UGH, one piece IOL tilted w supr  shift w TIDs]. Plan: H/O retina consult, deferred tx options, Dr Elizabet Huang recommended retina prn for VH/CME No VH today. Monitor.

## 2022-11-15 NOTE — IMPRESSION/PLAN
Impression: Capslr glaucoma w/pseudoef lens, bilateral, mild stage
h/o hyphema OD 2/2 UGH syndrome - resolved 12/3/19 Dennise Givens
-- s/p Baerveldt Tube + K Patch OD - Dr Aden Carrillo - choroidals after opening -- 8/7/2020 OD tube Opened as of 9/11/2020 Plan: Pachs:   582/575 Today's IOP :  23 / 16 Tmax  :  48/37 Target IOP 20 OU
C/D:.2x.2/.3x.3 - stable Left eye is the better seeing eye OCT RNFL updated 11/2022 OD 116um / thickening, likely confounded by cme
   OS 86um / stable HVF 24-2 (02/11/2021) OD: Stable OS: New nasal change but does not correlate with OCT. Pt denies Sulfa Allergy   // Pt denies Lung /Heart dx 1. Cont: Brimonidine TID OS & Timolol QAM OS 2. Patient's IOP is slightly elevated OD but stable. IOP OS good 3.  RTC as scheduled with Dr Aden Carrillo

## 2022-11-15 NOTE — IMPRESSION/PLAN
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 Plan: Essentially stable, pt understands limitations to vision. Prev consult with Dr Juanito Perez, pt deferred additional tx for CME. 
RTC 3-4 mo for repeat dilation & OCT mac

## 2023-05-18 ENCOUNTER — OFFICE VISIT (OUTPATIENT)
Dept: URBAN - METROPOLITAN AREA CLINIC 24 | Facility: CLINIC | Age: 83
End: 2023-05-18
Payer: MEDICARE

## 2023-05-18 DIAGNOSIS — H40.1431 CAPSULAR GLAUCOMA WITH PSEUDOEXFOLIATION OF LENS, BILATERAL, MILD STAGE: Primary | ICD-10-CM

## 2023-05-18 DIAGNOSIS — H35.351 CYSTOID MACULAR DEGENERATION, RIGHT EYE: ICD-10-CM

## 2023-05-18 PROCEDURE — 92083 EXTENDED VISUAL FIELD XM: CPT | Performed by: OPHTHALMOLOGY

## 2023-05-18 PROCEDURE — 99214 OFFICE O/P EST MOD 30 MIN: CPT | Performed by: OPHTHALMOLOGY

## 2023-05-18 ASSESSMENT — INTRAOCULAR PRESSURE
OD: 23
OS: 21

## 2023-05-18 NOTE — IMPRESSION/PLAN
Impression: Capslr glaucoma w/pseudoef lens, bilateral, mild stage
h/o hyphema OD 2/2 UGH syndrome - resolved 12/3/19 - Duayne Clubs and Shy
-- s/p Baerveldt Tube + K Patch OD - Dr Ann Marie Oropeza - choroidals after opening -- 8/7/2020 OD tube Opened as of 9/11/2020 Plan: Pt has Glaucoma    Gonio :  CBB with 3+ pig    Pachs:   582/575   Today's IOP : 23/21  Tmax  :  48/37 Target IOP 20 OU B-Scan done (11/5/19) No RD, no blood OD (Bscan performed 2/2 hyphema of unknown origin OD) Pt denies Family hx of Glaucoma Left eye is the better seeing eye HVF 24-2 (5/18/23) OD: Full OS nonpattern loss C/D:0.2x0.2 OU healthy nerves 5/19/21 OCT : Stable OU (02/11/2021) 84, 85 Pt denies Sulfa Allergy   // Pt denies Lung /Heart dx No previous medications used Plan :
1. Cont: 
Brimonidine TID OS Timolol QAM OS 2. Patient's IOP is Stable. Visual field with non pattern change OS (poor reliability). Patient to continue all medication as instructed. ONH appearance stable OU 3.  RTC in 3 months for IOP check w/ Dr. Mirtha Leonard (pt prefers every 3 months for IOP check - nervous about IOP OS given monocular status), and 1 year IOP check, HVF w/ Dr. Ann Marie Oropeza

## 2023-05-18 NOTE — IMPRESSION/PLAN
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  Plan: Continue care with Dr. Josh Mendes

## 2023-08-17 ENCOUNTER — OFFICE VISIT (OUTPATIENT)
Dept: URBAN - METROPOLITAN AREA CLINIC 24 | Facility: CLINIC | Age: 83
End: 2023-08-17
Payer: MEDICARE

## 2023-08-17 DIAGNOSIS — H40.1431 CAPSULAR GLAUCOMA WITH PSEUDOEXFOLIATION OF LENS, BILATERAL, MILD STAGE: Primary | ICD-10-CM

## 2023-08-17 PROCEDURE — 99214 OFFICE O/P EST MOD 30 MIN: CPT | Performed by: STUDENT IN AN ORGANIZED HEALTH CARE EDUCATION/TRAINING PROGRAM

## 2023-08-17 RX ORDER — TIMOLOL MALEATE 5 MG/ML
0.5 % SOLUTION/ DROPS OPHTHALMIC
Qty: 15 | Refills: 3 | Status: ACTIVE
Start: 2023-08-17

## 2023-08-17 ASSESSMENT — INTRAOCULAR PRESSURE
OS: 18
OD: 27

## 2023-08-28 ENCOUNTER — OFFICE VISIT (OUTPATIENT)
Dept: URBAN - METROPOLITAN AREA CLINIC 26 | Facility: CLINIC | Age: 83
End: 2023-08-28
Payer: MEDICARE

## 2023-08-28 DIAGNOSIS — H40.1431 CAPSULAR GLAUCOMA WITH PSEUDOEXFOLIATION OF LENS, BILATERAL, MILD STAGE: ICD-10-CM

## 2023-08-28 DIAGNOSIS — H53.8 OTHER VISUAL DISTURBANCES: Primary | ICD-10-CM

## 2023-08-28 PROCEDURE — 92134 CPTRZ OPH DX IMG PST SGM RTA: CPT | Performed by: OPTOMETRIST

## 2023-08-28 PROCEDURE — 99213 OFFICE O/P EST LOW 20 MIN: CPT | Performed by: OPTOMETRIST

## 2023-08-28 ASSESSMENT — INTRAOCULAR PRESSURE
OS: 16
OD: 15

## 2023-12-04 ENCOUNTER — OFFICE VISIT (OUTPATIENT)
Dept: URBAN - METROPOLITAN AREA CLINIC 24 | Facility: CLINIC | Age: 83
End: 2023-12-04
Payer: MEDICARE

## 2023-12-04 DIAGNOSIS — H40.1431 CAPSULAR GLAUCOMA WITH PSEUDOEXFOLIATION OF LENS, BILATERAL, MILD STAGE: Primary | ICD-10-CM

## 2023-12-04 PROCEDURE — 99213 OFFICE O/P EST LOW 20 MIN: CPT | Performed by: STUDENT IN AN ORGANIZED HEALTH CARE EDUCATION/TRAINING PROGRAM

## 2023-12-04 PROCEDURE — 92133 CPTRZD OPH DX IMG PST SGM ON: CPT | Performed by: STUDENT IN AN ORGANIZED HEALTH CARE EDUCATION/TRAINING PROGRAM

## 2023-12-04 ASSESSMENT — INTRAOCULAR PRESSURE
OD: 22
OS: 20

## 2024-04-01 ENCOUNTER — OFFICE VISIT (OUTPATIENT)
Dept: URBAN - METROPOLITAN AREA CLINIC 24 | Facility: CLINIC | Age: 84
End: 2024-04-01
Payer: MEDICARE

## 2024-04-01 DIAGNOSIS — H43.393 OTHER VITREOUS OPACITIES, BILATERAL: ICD-10-CM

## 2024-04-01 DIAGNOSIS — H35.351 CYSTOID MACULAR DEGENERATION, RIGHT EYE: ICD-10-CM

## 2024-04-01 DIAGNOSIS — H40.1431 CAPSULAR GLAUCOMA WITH PSEUDOEXFOLIATION OF LENS, BILATERAL, MILD STAGE: Primary | ICD-10-CM

## 2024-04-01 PROCEDURE — 99214 OFFICE O/P EST MOD 30 MIN: CPT | Performed by: STUDENT IN AN ORGANIZED HEALTH CARE EDUCATION/TRAINING PROGRAM

## 2024-04-01 PROCEDURE — 92134 CPTRZ OPH DX IMG PST SGM RTA: CPT | Performed by: STUDENT IN AN ORGANIZED HEALTH CARE EDUCATION/TRAINING PROGRAM

## 2024-04-01 PROCEDURE — 92133 CPTRZD OPH DX IMG PST SGM ON: CPT | Performed by: STUDENT IN AN ORGANIZED HEALTH CARE EDUCATION/TRAINING PROGRAM

## 2024-04-01 ASSESSMENT — KERATOMETRY
OD: 45.67
OS: 46.33

## 2024-04-01 ASSESSMENT — VISUAL ACUITY
OS: 20/40
OD: CF 3FT

## 2024-04-01 ASSESSMENT — INTRAOCULAR PRESSURE
OD: 19
OS: 17

## 2024-07-24 ENCOUNTER — OFFICE VISIT (OUTPATIENT)
Dept: URBAN - METROPOLITAN AREA CLINIC 24 | Facility: CLINIC | Age: 84
End: 2024-07-24
Payer: MEDICARE

## 2024-07-24 DIAGNOSIS — H40.1431 CAPSULAR GLAUCOMA WITH PSEUDOEXFOLIATION OF LENS, BILATERAL, MILD STAGE: Primary | ICD-10-CM

## 2024-07-24 PROCEDURE — 92083 EXTENDED VISUAL FIELD XM: CPT | Performed by: OPHTHALMOLOGY

## 2024-07-24 PROCEDURE — 99213 OFFICE O/P EST LOW 20 MIN: CPT | Performed by: OPHTHALMOLOGY

## 2024-07-24 ASSESSMENT — INTRAOCULAR PRESSURE
OS: 16
OD: 19

## 2025-01-22 ENCOUNTER — OFFICE VISIT (OUTPATIENT)
Dept: URBAN - METROPOLITAN AREA CLINIC 24 | Facility: CLINIC | Age: 85
End: 2025-01-22
Payer: MEDICARE

## 2025-01-22 DIAGNOSIS — H40.1431 CAPSULAR GLAUCOMA WITH PSEUDOEXFOLIATION OF LENS, BILATERAL, MILD STAGE: Primary | ICD-10-CM

## 2025-01-22 PROCEDURE — 92083 EXTENDED VISUAL FIELD XM: CPT | Performed by: STUDENT IN AN ORGANIZED HEALTH CARE EDUCATION/TRAINING PROGRAM

## 2025-01-22 PROCEDURE — 99213 OFFICE O/P EST LOW 20 MIN: CPT | Performed by: STUDENT IN AN ORGANIZED HEALTH CARE EDUCATION/TRAINING PROGRAM

## 2025-01-22 RX ORDER — BRIMONIDINE TARTRATE 2 MG/ML
0.2 % SOLUTION/ DROPS OPHTHALMIC
Qty: 15 | Refills: 4 | Status: ACTIVE
Start: 2025-01-22

## 2025-01-22 ASSESSMENT — INTRAOCULAR PRESSURE
OD: 19
OS: 16

## 2025-06-23 ENCOUNTER — OFFICE VISIT (OUTPATIENT)
Dept: URBAN - METROPOLITAN AREA CLINIC 24 | Facility: CLINIC | Age: 85
End: 2025-06-23
Payer: MEDICARE

## 2025-06-23 DIAGNOSIS — H35.351 CYSTOID MACULAR DEGENERATION, RIGHT EYE: ICD-10-CM

## 2025-06-23 DIAGNOSIS — H40.1431 CAPSULAR GLAUCOMA WITH PSEUDOEXFOLIATION OF LENS, BILATERAL, MILD STAGE: Primary | ICD-10-CM

## 2025-06-23 PROCEDURE — 99214 OFFICE O/P EST MOD 30 MIN: CPT | Performed by: STUDENT IN AN ORGANIZED HEALTH CARE EDUCATION/TRAINING PROGRAM

## 2025-06-23 PROCEDURE — 92133 CPTRZD OPH DX IMG PST SGM ON: CPT | Performed by: STUDENT IN AN ORGANIZED HEALTH CARE EDUCATION/TRAINING PROGRAM

## 2025-06-23 RX ORDER — TIMOLOL MALEATE 5 MG/ML
0.5 % SOLUTION/ DROPS OPHTHALMIC
Qty: 15 | Refills: 3 | Status: ACTIVE
Start: 2025-06-23

## 2025-06-23 ASSESSMENT — INTRAOCULAR PRESSURE
OS: 16
OD: 19

## 2025-06-23 ASSESSMENT — VISUAL ACUITY
OS: 20/25
OD: CF 1FT